# Patient Record
Sex: FEMALE | URBAN - METROPOLITAN AREA
[De-identification: names, ages, dates, MRNs, and addresses within clinical notes are randomized per-mention and may not be internally consistent; named-entity substitution may affect disease eponyms.]

---

## 2020-08-17 ENCOUNTER — APPOINTMENT (RX ONLY)
Dept: URBAN - METROPOLITAN AREA CLINIC 345 | Facility: CLINIC | Age: 50
Setting detail: DERMATOLOGY
End: 2020-08-17

## 2020-08-17 DIAGNOSIS — D485 NEOPLASM OF UNCERTAIN BEHAVIOR OF SKIN: ICD-10-CM

## 2020-08-17 PROBLEM — D48.5 NEOPLASM OF UNCERTAIN BEHAVIOR OF SKIN: Status: ACTIVE | Noted: 2020-08-17

## 2020-08-17 PROCEDURE — ? COUNSELING

## 2020-08-17 PROCEDURE — 99202 OFFICE O/P NEW SF 15 MIN: CPT

## 2020-08-17 PROCEDURE — ? TREATMENT REGIMEN

## 2020-08-17 ASSESSMENT — LOCATION ZONE DERM: LOCATION ZONE: NOSE

## 2020-08-17 ASSESSMENT — LOCATION SIMPLE DESCRIPTION DERM: LOCATION SIMPLE: RIGHT NOSE

## 2020-08-17 ASSESSMENT — LOCATION DETAILED DESCRIPTION DERM: LOCATION DETAILED: RIGHT NASAL ALA

## 2020-08-17 NOTE — PROCEDURE: TREATMENT REGIMEN
Plan: The patient appears to have an intradermal nevus on her right ala however a basal cell carcinoma cannot be excluded with a history of rapid growth over the past 1 to 2 years.  We discussed shave biopsy versus punch biopsy.  Side effects include a white scar, depressed scar or a 1 in 20 risk of recurrence which would manifest as a brown spot.  Patient is concerned about recurrence of the lesion causing a dark spot at the scar. She wants to avoid this.  I recommend that she get a consultation with a plastic surgeon
Detail Level: Detailed

## 2020-08-17 NOTE — HPI: EVALUATION OF SKIN LESION(S)
What Type Of Note Output Would You Prefer (Optional)?: Bullet Format
Hpi Title: Evaluation of a Skin Lesion
Have Your Spot(S) Been Treated In The Past?: has not been treated
Additional History: The patient feels that the mole has been present for many years but over the past 1 to 2 years it has significantly increased in size and become more protuberant.  It does not bleed.  She is very concerned about possible scarring from its removal.